# Patient Record
Sex: MALE | Race: BLACK OR AFRICAN AMERICAN | NOT HISPANIC OR LATINO | Employment: FULL TIME | URBAN - METROPOLITAN AREA
[De-identification: names, ages, dates, MRNs, and addresses within clinical notes are randomized per-mention and may not be internally consistent; named-entity substitution may affect disease eponyms.]

---

## 2020-10-22 ENCOUNTER — HOSPITAL ENCOUNTER (EMERGENCY)
Facility: OTHER | Age: 35
Discharge: HOME | End: 2020-10-22
Attending: EMERGENCY MEDICINE
Payer: MEDICAID

## 2020-10-22 VITALS
BODY MASS INDEX: 28.35 KG/M2 | HEART RATE: 90 BPM | TEMPERATURE: 99 F | OXYGEN SATURATION: 96 % | DIASTOLIC BLOOD PRESSURE: 72 MMHG | SYSTOLIC BLOOD PRESSURE: 134 MMHG | WEIGHT: 245 LBS | RESPIRATION RATE: 18 BRPM | HEIGHT: 78 IN

## 2020-10-22 DIAGNOSIS — Z76.0 ENCOUNTER FOR MEDICATION REFILL: ICD-10-CM

## 2020-10-22 DIAGNOSIS — S62.337A CLOSED DISPLACED FRACTURE OF NECK OF FIFTH METACARPAL BONE OF LEFT HAND, INITIAL ENCOUNTER: Primary | ICD-10-CM

## 2020-10-22 PROCEDURE — 29125 APPL SHORT ARM SPLINT STATIC: CPT | Mod: LT

## 2020-10-22 PROCEDURE — 99284 EMERGENCY DEPT VISIT MOD MDM: CPT | Mod: 25

## 2020-10-22 PROCEDURE — 25000003 PHARM REV CODE 250: Performed by: PHYSICIAN ASSISTANT

## 2020-10-22 RX ORDER — OXYCODONE AND ACETAMINOPHEN 5; 325 MG/1; MG/1
1 TABLET ORAL EVERY 6 HOURS PRN
Qty: 8 TABLET | Refills: 0 | OUTPATIENT
Start: 2020-10-22 | End: 2020-10-27

## 2020-10-22 RX ORDER — AMLODIPINE BESYLATE 5 MG/1
5 TABLET ORAL DAILY
Qty: 30 TABLET | Refills: 0 | Status: SHIPPED | OUTPATIENT
Start: 2020-10-22 | End: 2020-11-21

## 2020-10-22 RX ORDER — HYDROCHLOROTHIAZIDE 25 MG/1
25 TABLET ORAL DAILY
Qty: 30 TABLET | Refills: 0 | Status: SHIPPED | OUTPATIENT
Start: 2020-10-22 | End: 2021-02-22 | Stop reason: SDUPTHER

## 2020-10-22 RX ORDER — IBUPROFEN 400 MG/1
800 TABLET ORAL
Status: DISCONTINUED | OUTPATIENT
Start: 2020-10-22 | End: 2020-10-22

## 2020-10-22 RX ORDER — OXYCODONE AND ACETAMINOPHEN 5; 325 MG/1; MG/1
2 TABLET ORAL
Status: COMPLETED | OUTPATIENT
Start: 2020-10-22 | End: 2020-10-22

## 2020-10-22 RX ORDER — OMEPRAZOLE 20 MG/1
20 CAPSULE, DELAYED RELEASE ORAL DAILY
Qty: 30 CAPSULE | Refills: 0 | Status: SHIPPED | OUTPATIENT
Start: 2020-10-22 | End: 2021-06-08 | Stop reason: SDUPTHER

## 2020-10-22 RX ADMIN — OXYCODONE HYDROCHLORIDE AND ACETAMINOPHEN 2 TABLET: 5; 325 TABLET ORAL at 08:10

## 2020-10-22 NOTE — FIRST PROVIDER EVALUATION
Emergency Department TeleTriage Encounter Note      CHIEF COMPLAINT    Chief Complaint   Patient presents with    Hand Pain     pt hit  wall with left hand now with swelling and pain on little finger side.        VITAL SIGNS   Initial Vitals [10/22/20 1849]   BP Pulse Resp Temp SpO2   134/72 90 18 98.5 °F (36.9 °C) 96 %      MAP       --            ALLERGIES    Review of patient's allergies indicates:  No Known Allergies    PROVIDER TRIAGE NOTE  HPI: Mansoor Cain, a 34 y.o. male presents to the ED for evaluation of left hand pain after hitting a wall today.  Patient is right-hand dominant.  Denies any previous history of fracture surgeries to site.  No treatments tried.        ORDERS  Labs Reviewed - No data to display    ED Orders (720h ago, onward)    Start Ordered     Status Ordering Provider    10/22/20 1900 10/22/20 1857  ibuprofen tablet 800 mg  ED 1 Time      Ordered CHARLOTTE MORRIS    10/22/20 1858 10/22/20 1857  Apply ice pack to affected area  Once      Ordered CHARLOTTE MORRIS    10/22/20 1857 10/22/20 1857  X-Ray Hand 3 view Left  1 time imaging      Ordered CHARLOTTE MORRIS            Virtual Visit Note: The provider triage portion of this emergency department evaluation and documentation was performed via [employer], a HIPAA-compliant telemedicine application, in concert with a tele-presenter in the room. A face to face patient evaluation with one of my colleagues will occur once the patient is placed in an emergency department room.      DISCLAIMER: This note was prepared with [employer] voice recognition transcription software. Garbled syntax, mangled pronouns, and other bizarre constructions may be attributed to that software system.

## 2020-10-22 NOTE — Clinical Note
"Mansoor Whitten (Curtis)son was seen and treated in our emergency department on 10/22/2020.  He may return to work on 10/24/2020.       If you have any questions or concerns, please don't hesitate to call.       RN    "

## 2020-10-22 NOTE — Clinical Note
"Mansoor Whitten (Curtis)son was seen and treated in our emergency department on 10/22/2020.  He may return to work on 10/27/2020.       If you have any questions or concerns, please don't hesitate to call.       RN    "

## 2020-10-23 NOTE — DISCHARGE INSTRUCTIONS
You can take 600 mg of Motrin every 6-8 hours as needed for pain.  Use Percocet for breakthrough pain.

## 2020-10-23 NOTE — ED TRIAGE NOTES
Pt presents to ER via POV with c/o LEFT hand pain after punching a door yesterday. Pt reports the swelling and pain worsened today. Obvious swelling/deformity. Denies numbness/tingling to distal fingers but does report decreased sensation to medial palm.  Has not taken anything to alleviate pain.

## 2020-10-23 NOTE — ED PROVIDER NOTES
Encounter Date: 10/22/2020       History     Chief Complaint   Patient presents with    Hand Pain     pt hit  wall with left hand now with swelling and pain on little finger side.      Patient is a 34-year-old male with hypertension and GERD who presents to the emergency department with   Hand pain.  Patient states he punched a wall with his left hand yesterday.  He states he developed significant swelling and pain today.  He denies numbness.  He is right-hand dominant.   Patient is also requesting refill of his hypertension and GERD medication, he has ran out less than 1 week ago.    The history is provided by the patient.     Review of patient's allergies indicates:  No Known Allergies  Past Medical History:   Diagnosis Date    GERD (gastroesophageal reflux disease)     Hypertension      Past Surgical History:   Procedure Laterality Date    HERNIA REPAIR       No family history on file.  Social History     Tobacco Use    Smoking status: Current Every Day Smoker   Substance Use Topics    Alcohol use: Yes    Drug use: Not on file     Review of Systems   Constitutional: Negative for chills and fever.   Musculoskeletal: Positive for arthralgias and joint swelling.   Skin: Negative for color change and wound.   Allergic/Immunologic: Negative for immunocompromised state.   Neurological: Negative for weakness and numbness.       Physical Exam     Initial Vitals [10/22/20 1849]   BP Pulse Resp Temp SpO2   134/72 90 18 98.5 °F (36.9 °C) 96 %      MAP       --         Physical Exam    Constitutional: Vital signs are normal. He is cooperative. No distress.   HENT:   Head: Normocephalic and atraumatic.   Eyes: Conjunctivae and EOM are normal.   Neck: Normal range of motion. Neck supple.   Cardiovascular:   Pulses:       Radial pulses are 2+ on the right side and 2+ on the left side.   Pulmonary/Chest: No respiratory distress.   Musculoskeletal:      Comments: Significant edema to the volar, medial aspect of the left  hand with associated bony tenderness to the 5th metacarpal bone.  Normal range of motion of fingers.   Neurological: He is alert and oriented to person, place, and time. GCS eye subscore is 4. GCS verbal subscore is 5. GCS motor subscore is 6.   No signs of median, ulnar, radial nerve damage to the left hand.   Skin: Skin is warm and dry. No rash noted.         ED Course   Orthopedic Injury    Date/Time: 10/23/2020 10:10 AM  Performed by: Sudeep Chan PA-C  Authorized by: Norris Valenzuela MD     Location procedure was performed:  [facility], [place], operated by [facility] EMERGENCY DEPARTMENT  Injury:     Injury location:  Hand    Location details:  Left hand    Injury type:  Fracture    Fracture type: fifth metacarpal        Pre-procedure assessment:     Neurovascular status: Neurovascularly intact      Distal perfusion: normal      Neurological function: normal      Range of motion: normal        Selections made in this section will also lock the Injury type section above.:     Manipulation performed?: No      Immobilization:  Splint    Splint type:  Ulnar gutter    Supplies used:  Ortho-Glass    Complications: No    Post-procedure assessment:     Neurovascular status: Neurovascularly intact      Distal perfusion: normal      Neurological function: normal      Range of motion: splinted      Patient tolerance:  Patient tolerated the procedure well with no immediate complications      Labs Reviewed - No data to display       Imaging Results          X-Ray Hand 3 view Left (Final result)  Result time 10/22/20 19:40:42    Final result by Omid Medrano MD (10/22/20 19:40:42)                 Impression:      Acute 5th metacarpal fracture (boxer's fracture).      Electronically signed by: Omid Medrano MD  Date:    10/22/2020  Time:    19:40             Narrative:    EXAMINATION:  XR HAND COMPLETE 3 VIEW LEFT    CLINICAL HISTORY:  left hand pain;.    TECHNIQUE:  PA, lateral, and oblique views of the left hand were  performed.    COMPARISON:  None    FINDINGS:  Acute displaced fracture is seen involving the distal aspect of the 5th metacarpal head neck region.  There is palmar angulation of the distal fracture component.  No additional acute displaced fractures or dislocation seen.                                 Medical Decision Making:   Initial Assessment:   Urgent evaluation of a 34 y.o. male  presenting to the emergency department complaining of  left hand pain and swelling after punching a wall. Patient is afebrile, nontoxic appearing and hemodynamically stable.  - left hand is distally neurovascular intact.  No signs of tendon or nerve injury.  -  X-ray obtained from tele triage reveals an acute 5th metacarpal fracture with palmar angulation of the distal component.  ED Management:  -  Patient was given ice, analgesics.  Hand was splinted in ulnar gutter.  Patient is given information to follow-up with orthopedics.    He had no other complaints today and was stable at discharge.                             Clinical Impression:       ICD-10-CM ICD-9-CM   1. Closed displaced fracture of neck of fifth metacarpal bone of left hand, initial encounter  S62.337A 815.04   2. Encounter for medication refill  Z76.0 V68.1                          ED Disposition Condition    Discharge Stable        ED Prescriptions     Medication Sig Dispense Start Date End Date Auth. Provider    amLODIPine (NORVASC) 5 MG tablet Take 1 tablet (5 mg total) by mouth once daily. 30 tablet 10/22/2020 11/21/2020 Sudeep Chan PA-C    hydroCHLOROthiazide (HYDRODIURIL) 25 MG tablet Take 1 tablet (25 mg total) by mouth once daily. 30 tablet 10/22/2020  Sudeep Chan PA-C    omeprazole (PRILOSEC) 20 MG capsule Take 1 capsule (20 mg total) by mouth once daily. 30 capsule 10/22/2020  Sudeep Chan PA-C    oxyCODONE-acetaminophen (PERCOCET) 5-325 mg per tablet Take 1 tablet by mouth every 6 (six) hours as needed for Pain. 8 tablet 10/22/2020  Sudeep  ANAIS Chan PA-C        Follow-up Information     Follow up With Specialties Details Why Contact Info    [facility]- Dr. Guzman ([facility]) Every Other Wednesday  Schedule an appointment as soon as possible for a visit   [phone]    [facility]  Schedule an appointment as soon as possible for a visit   [phone]                                       Sudeep Chan PA-C  10/23/20 1013

## 2020-10-27 ENCOUNTER — HOSPITAL ENCOUNTER (EMERGENCY)
Facility: OTHER | Age: 35
Discharge: HOME | End: 2020-10-27
Attending: EMERGENCY MEDICINE
Payer: MEDICAID

## 2020-10-27 VITALS
BODY MASS INDEX: 28.35 KG/M2 | HEIGHT: 78 IN | DIASTOLIC BLOOD PRESSURE: 72 MMHG | SYSTOLIC BLOOD PRESSURE: 140 MMHG | WEIGHT: 245 LBS | HEART RATE: 91 BPM | RESPIRATION RATE: 18 BRPM | OXYGEN SATURATION: 96 % | TEMPERATURE: 98 F

## 2020-10-27 DIAGNOSIS — S62.337A CLOSED DISPLACED FRACTURE OF NECK OF FIFTH METACARPAL BONE OF LEFT HAND, INITIAL ENCOUNTER: Primary | ICD-10-CM

## 2020-10-27 PROCEDURE — 99284 EMERGENCY DEPT VISIT MOD MDM: CPT

## 2020-10-27 RX ORDER — IBUPROFEN 800 MG/1
800 TABLET ORAL EVERY 6 HOURS PRN
Qty: 30 TABLET | Refills: 0 | Status: SHIPPED | OUTPATIENT
Start: 2020-10-27

## 2020-10-27 RX ORDER — OXYCODONE AND ACETAMINOPHEN 5; 325 MG/1; MG/1
1 TABLET ORAL EVERY 4 HOURS PRN
Qty: 10 TABLET | Refills: 0 | Status: SHIPPED | OUTPATIENT
Start: 2020-10-27 | End: 2021-02-22 | Stop reason: CLARIF

## 2020-10-27 NOTE — ED PROVIDER NOTES
"Encounter Date: 10/27/2020    SCRIBE #1 NOTE: I, Joy Levine, am scribing for, and in the presence of, Dr. Thrasher.       History     Chief Complaint   Patient presents with    Medication Refill     +Seen and evaluated on 10/22 for metacarpal fracture, reporting continued pain to hand, requesting refill of medication.      Time seen by provider: 3:12 PM    This is a 34 y.o. male with hx of HTN who presents with complaint of left hand pain. Patient was seen here five days ago after he punched a wall one day prior and had X-Ray that showed left fifth metacarpal fracture. His hand was placed in a cast and he does not have a sling. He reports slamming his left hand in a car door a couple hours ago. The door closed on the area protected by the cast, though he has had increased pain since then. He is not concerned for new or worsening of current fracture and does not want an X-Ray. He requests refill of pain medication. He denies numbness or weakness of exposed fingers. He is right hand dominant.    The history is provided by the patient and medical records.     Review of patient's allergies indicates:   Allergen Reactions    Hydrocodone Swelling    Acetaminophen Other (See Comments)     "it gives me heartburn"     Past Medical History:   Diagnosis Date    GERD (gastroesophageal reflux disease)     Hypertension      Past Surgical History:   Procedure Laterality Date    HERNIA REPAIR       No family history on file.  Social History     Tobacco Use    Smoking status: Current Every Day Smoker   Substance Use Topics    Alcohol use: Yes    Drug use: Not on file     Review of Systems   Constitutional: Negative for fever.   HENT: Negative for sore throat.    Respiratory: Negative for shortness of breath.    Cardiovascular: Negative for chest pain.   Gastrointestinal: Negative for nausea.   Genitourinary: Negative for dysuria.   Musculoskeletal:        Positive for hand pain.   Skin: Negative for wound.   Neurological: " Negative for weakness and numbness.   Hematological: Does not bruise/bleed easily.       Physical Exam     Initial Vitals [10/27/20 1438]   BP Pulse Resp Temp SpO2   (!) 140/72 91 18 98.2 °F (36.8 °C) 96 %      MAP       --         Physical Exam    Nursing note and vitals reviewed.  Constitutional: He appears well-developed and well-nourished. He is not diaphoretic. No distress.   Right hand dominant.   HENT:   Head: Normocephalic and atraumatic.   Eyes: EOM are normal.   Neck: Normal range of motion. Neck supple.   Pulmonary/Chest: No respiratory distress.   Musculoskeletal:      Comments: LUE: Left hand in ulnar gutter, well fitting. No distal edema. Normal ROM at exposed fingers and elbow.   Neurological: He is alert and oriented to person, place, and time. No sensory deficit.   Skin: Skin is warm and dry. Capillary refill takes less than 2 seconds.   Psychiatric: He has a normal mood and affect. His behavior is normal.         ED Course   Procedures  Labs Reviewed - No data to display            Medical Decision Making:   History:   Old Medical Records: I decided to obtain old medical records.            Scribe Attestation:   Scribe #1: I performed the above scribed service and the documentation accurately describes the services I performed. I attest to the accuracy of the note.    Attending Attestation:           Physician Attestation for Scribe:  Physician Attestation Statement for Scribe #1: I, Dr. Thrasher, reviewed documentation, as scribed by Joy Levine in my presence, and it is both accurate and complete.                    Patient presents due to pain in the left hand.  Fractured approximately 5 days ago when punching a wall with his non dominant hand.  Seen here several days ago a splint was placed.  He states it was hit inside a closing car door on the splint earlier today, causing increasing pain .  On exam the cast is intact, he does not have significant edema and has good distal strength and  sensation, capillary refill.  He does not describe the impact as significant enough that I think it would translate to any damage within the splint material.  He states he has run out of the pain medication prescribed.  He also has not been elevating or using a sling he will be provided with 1, I discussed rice.  Will start anti-inflammatory in addition to a refill of the narcotic pain medication.  Stressed the need to follow-up with orthopedics       Clinical Impression:       ICD-10-CM ICD-9-CM   1. Closed displaced fracture of neck of fifth metacarpal bone of left hand, initial encounter  S62.337A 815.04                          ED Disposition Condition    Discharge Stable        ED Prescriptions     Medication Sig Dispense Start Date End Date Auth. Provider    ibuprofen (ADVIL,MOTRIN) 800 MG tablet Take 1 tablet (800 mg total) by mouth every 6 (six) hours as needed for Pain. 30 tablet 10/27/2020  Lan Thrasher II, MD    oxyCODONE-acetaminophen (PERCOCET) 5-325 mg per tablet Take 1 tablet by mouth every 4 (four) hours as needed for Pain. 10 tablet 10/27/2020  Lan Thrasher II, MD        Follow-up Information     Follow up With Specialties Details Why Contact [facility] Surgical Oncology, Orthopedic Surgery, Genetics, Physical Medicine and Rehabilitation, Occupational Therapy, Radiology Call in 1 day Schedule an appointment with [facility]- Dr. Guzman ([facility]) Every Other Wednesday [address] [facility] [address]  [phone]                                         Lan Thrasher II, MD  10/27/20 1715       Lan Thrasher II, MD  10/27/20 1715       Lan Thrasher II, MD  11/06/20 0273

## 2020-11-19 ENCOUNTER — HOSPITAL ENCOUNTER (EMERGENCY)
Facility: HOSPITAL | Age: 35
Discharge: HOME | End: 2020-11-19
Attending: EMERGENCY MEDICINE
Payer: MEDICAID

## 2020-11-19 VITALS
BODY MASS INDEX: 28.35 KG/M2 | RESPIRATION RATE: 18 BRPM | DIASTOLIC BLOOD PRESSURE: 89 MMHG | SYSTOLIC BLOOD PRESSURE: 138 MMHG | HEIGHT: 78 IN | WEIGHT: 245 LBS | TEMPERATURE: 98 F | HEART RATE: 88 BPM | OXYGEN SATURATION: 100 %

## 2020-11-19 DIAGNOSIS — Z47.89 CAST DISCOMFORT: Primary | ICD-10-CM

## 2020-11-19 DIAGNOSIS — S62.317D CLOSED DISPLACED FRACTURE OF BASE OF FIFTH METACARPAL BONE OF LEFT HAND WITH ROUTINE HEALING, SUBSEQUENT ENCOUNTER: ICD-10-CM

## 2020-11-19 PROCEDURE — 29125 APPL SHORT ARM SPLINT STATIC: CPT | Mod: LT

## 2020-11-19 PROCEDURE — 99284 PR EMERGENCY DEPT VISIT,LEVEL IV: ICD-10-PCS | Mod: 25,,, | Performed by: EMERGENCY MEDICINE

## 2020-11-19 PROCEDURE — 99284 EMERGENCY DEPT VISIT MOD MDM: CPT | Mod: 25,,, | Performed by: EMERGENCY MEDICINE

## 2020-11-19 PROCEDURE — 25000003 PHARM REV CODE 250: Performed by: EMERGENCY MEDICINE

## 2020-11-19 PROCEDURE — 29125 PR APPLY FOREARM SPLINT,STATIC: ICD-10-PCS | Mod: LT,,, | Performed by: EMERGENCY MEDICINE

## 2020-11-19 PROCEDURE — 29125 APPL SHORT ARM SPLINT STATIC: CPT | Mod: LT,,, | Performed by: EMERGENCY MEDICINE

## 2020-11-19 PROCEDURE — 99283 EMERGENCY DEPT VISIT LOW MDM: CPT

## 2020-11-19 RX ORDER — IBUPROFEN 600 MG/1
600 TABLET ORAL
Status: COMPLETED | OUTPATIENT
Start: 2020-11-19 | End: 2020-11-19

## 2020-11-19 RX ADMIN — IBUPROFEN 600 MG: 600 TABLET, FILM COATED ORAL at 05:11

## 2020-11-19 NOTE — ED NOTES
Patient identifiers verified and correct for Mr Cain  C/C: Cast splint removed per patient SEE NN  APPEARANCE: awake and alert in NAD.  SKIN: warm, dry and intact. No breakdown or bruising.  MUSCULOSKELETAL: Patient moving all extremities spontaneously, no obvious swelling or deformities noted. Ambulates independently.  RESPIRATORY: Denies shortness of breath.Respirations unlabored.   CARDIAC: Denies CP, 2+ distal pulses; no peripheral edema  ABDOMEN: S/ND/NT, Denies nausea  : voids spontaneously, denies difficulty  Neurologic: AAO x 4; follows commands equal strength in all extremities; denies numbness/tingling. Denies dizziness Denies weakness

## 2020-11-19 NOTE — ED PROVIDER NOTES
"Encounter Date: 11/19/2020    SCRIBE #1 NOTE: I, Porfirio Doan, am scribing for, and in the presence of,  Dane Peres MD. I have scribed the entire note.       History     Chief Complaint   Patient presents with    Cast Repair     placed on oct 21, splint got wet,      The patient is a 35 y.o. male with PMHx of HTN and GERD who presents to the ED with a request for a splint repair after his splint got wet earlier today. The splint was initially placed on Oct 21. The patient presents with pain in the dorsal, medial aspect of his left hand. He is unable to clench his hand into a fist due to the pain. The patient has an appointment with orthopedics at [facility] next week for the cast. The patient has not taken anything for pain PTA, and is requesting pain medications in the ED. His allergies include hydrocodone and acetaminophen.      The history is provided by the patient and medical records.     Review of patient's allergies indicates:   Allergen Reactions    Hydrocodone Swelling    Acetaminophen Other (See Comments)     "it gives me heartburn"     Past Medical History:   Diagnosis Date    GERD (gastroesophageal reflux disease)     Hypertension      Past Surgical History:   Procedure Laterality Date    HERNIA REPAIR       History reviewed. No pertinent family history.  Social History     Tobacco Use    Smoking status: Current Every Day Smoker     Packs/day: 0.50     Types: Cigarettes    Smokeless tobacco: Never Used   Substance Use Topics    Alcohol use: Not Currently    Drug use: Never     Review of Systems  General: No fever.  No chills.  Eyes: No visual changes.  Head: No headache.    Integument: No rashes or lesions.  Chest: No shortness of breath.  Cardiovascular: No chest pain.  Abdomen: No abdominal pain.  No nausea or vomiting.  Urinary: No abnormal urination.  Neurologic: No focal weakness.  No numbness.  Hematologic: No easy bruising.  Musculoskeletal: +Right hand pain (see " HPI)  Endocrine: No excessive thirst or urination.    Physical Exam     Initial Vitals [11/19/20 1700]   BP Pulse Resp Temp SpO2   138/89 88 18 98.1 °F (36.7 °C) 100 %      MAP       --         Physical Exam  Appearance: No acute distress.  Skin: No rashes seen.  Good turgor.  No abrasions.  No ecchymoses.  Eyes: No conjunctival injection. EOMI, PERRL.  ENT: Oropharynx clear.    Chest:  No increased work of breathing, bilateral chest rise.  Cardiovascular: Regular rate and rhythm.  Normal equal bilateral radial pulses.  Abdomen: Soft.  Not distended.  Nontender.  No guarding.  No rebound. No Masses  Musculoskeletal: Good range of motion all joints.  No deformities.  Neck supple, full range of motion, no obvious deformity. Normal PSMs. Tenderness over the fifth MCP. No gross deformity in the RUE. Full ROM of right wrist, PIP, and DIP.  Neurologic: Moves all extremities.  Normal sensation.  No facial droop.  Normal speech.    Mental Status:  Alert and oriented x 3.  Appropriate, conversant.      ED Course   Splint Application    Date/Time: 11/19/2020 5:59 PM  Performed by: Dane Peres MD  Authorized by: Dane Peres MD   Consent Done: Yes  Consent: Verbal consent obtained.  Consent given by: patient  Location details: left hand  Splint type: ulnar gutter  Supplies used: Ortho-Glass  Post-procedure: The splinted body part was neurovascularly unchanged following the procedure.  Patient tolerance: Patient tolerated the procedure well with no immediate complications        Labs Reviewed - No data to display       Imaging Results    None          Medical Decision Making:   History:   Old Medical Records: I decided to obtain old medical records.  Old Records Summarized: other records.      patient here for reapplication his ulnar gutter splint after getting it wet earlier today.  No signs of new injury.  Neurovascularly intact.  Cast was applied as above.  Do not suspect new fracture, infection, nerve or vessel  injury.  Stable for discharge. Will followup with ortho.         Scribe Attestation:   Scribe #1: I performed the above scribed service and the documentation accurately describes the services I performed. I attest to the accuracy of the note.                      Clinical Impression:         Disposition:   Disposition: Discharged  Condition: Stable                          Dane Peres MD  11/19/20 2121

## 2020-11-19 NOTE — ED TRIAGE NOTES
Patient states his cast to left hand got wet so he pulled it off this morning, arrives with Ace wrap in place. Splint originally placed 10/21

## 2020-11-24 ENCOUNTER — TELEPHONE (OUTPATIENT)
Dept: ORTHOPEDICS | Facility: CLINIC | Age: 35
End: 2020-11-24

## 2020-11-27 ENCOUNTER — TELEPHONE (OUTPATIENT)
Dept: SURGERY | Facility: CLINIC | Age: 35
End: 2020-11-27

## 2020-11-30 ENCOUNTER — TELEPHONE (OUTPATIENT)
Dept: DIABETES | Facility: CLINIC | Age: 35
End: 2020-11-30

## 2020-11-30 DIAGNOSIS — T14.8XXA FRACTURE: Primary | ICD-10-CM

## 2020-11-30 NOTE — TELEPHONE ENCOUNTER
called and would like to get patient in sooner has a fracture. Message was previously left on voicemail. Patient needs an ASAP appt

## 2020-11-30 NOTE — TELEPHONE ENCOUNTER
----- Message from Jodie Barnard sent at 11/30/2020  4:25 PM CST -----  Chelita with #[phone]   She's calling for a sooner appt

## 2020-11-30 NOTE — TELEPHONE ENCOUNTER
"Returned call to "Beverly." Beverly stated she is a  with the "[employer]." Beverly placed Mansoor, the patient, on speaker phone so that I may speak with him. Mansoor has been made aware of his appointment for this upcoming Wednesday at 10 AM, and that updated imaging will be needed prior to his appointment time. I encouraged going down to registration at 9 am to have imaging of his hand completed the morning of his appointment. Beverly and Mansoor both voiced understanding and neither had questions. No further issues discussed. X-ray order placed.  "

## 2020-11-30 NOTE — TELEPHONE ENCOUNTER
----- Message from Sirena Guevara sent at 11/30/2020  4:04 PM CST -----  Contact: Elvia Villegas has a referral in the system needing an appt for his hand fracture.       Contact Info

## 2020-12-01 ENCOUNTER — TELEPHONE (OUTPATIENT)
Dept: ORTHOPEDICS | Facility: CLINIC | Age: 35
End: 2020-12-01

## 2020-12-01 NOTE — TELEPHONE ENCOUNTER
Left voicemail for patient to call our office, need to remind patient to go for xray prior to appointment on 12/2/2020.

## 2020-12-02 NOTE — TELEPHONE ENCOUNTER
----- Message from Delia Tidwell sent at 12/2/2020  9:10 AM CST -----  Regarding: appt  Contact: [place] [address]  Pt having transportation issue  Unable to accommodate him.   Wrong address on medicaid file   Please call pt to reschedule  appt was today  12/2    Beverly from [place] [facility]   called with information    [phone]  If you need to speak to her

## 2020-12-23 ENCOUNTER — OFFICE VISIT (OUTPATIENT)
Dept: ORTHOPEDICS | Facility: CLINIC | Age: 35
End: 2020-12-23
Payer: MEDICAID

## 2020-12-23 VITALS
HEART RATE: 79 BPM | SYSTOLIC BLOOD PRESSURE: 125 MMHG | WEIGHT: 242.94 LBS | BODY MASS INDEX: 28.11 KG/M2 | HEIGHT: 78 IN | DIASTOLIC BLOOD PRESSURE: 83 MMHG

## 2020-12-23 DIAGNOSIS — S62.317D CLOSED DISPLACED FRACTURE OF BASE OF FIFTH METACARPAL BONE OF LEFT HAND WITH ROUTINE HEALING, SUBSEQUENT ENCOUNTER: Primary | ICD-10-CM

## 2020-12-23 PROCEDURE — 99999 PR PBB SHADOW E&M-EST. PATIENT-LVL III: ICD-10-PCS | Mod: PBBFAC,,, | Performed by: ORTHOPAEDIC SURGERY

## 2020-12-23 PROCEDURE — 99203 PR OFFICE/OUTPT VISIT, NEW, LEVL III, 30-44 MIN: ICD-10-PCS | Mod: S$PBB,,, | Performed by: ORTHOPAEDIC SURGERY

## 2020-12-23 PROCEDURE — 99999 PR PBB SHADOW E&M-EST. PATIENT-LVL III: CPT | Mod: PBBFAC,,, | Performed by: ORTHOPAEDIC SURGERY

## 2020-12-23 PROCEDURE — 99213 OFFICE O/P EST LOW 20 MIN: CPT | Mod: PBBFAC,PN | Performed by: ORTHOPAEDIC SURGERY

## 2020-12-23 PROCEDURE — 99203 OFFICE O/P NEW LOW 30 MIN: CPT | Mod: S$PBB,,, | Performed by: ORTHOPAEDIC SURGERY

## 2020-12-23 RX ORDER — CELECOXIB 200 MG/1
CAPSULE ORAL
COMMUNITY
Start: 2020-11-06

## 2021-01-19 ENCOUNTER — TELEPHONE (OUTPATIENT)
Dept: ORTHOPEDICS | Facility: CLINIC | Age: 36
End: 2021-01-19

## 2021-02-22 ENCOUNTER — HOSPITAL ENCOUNTER (EMERGENCY)
Facility: HOSPITAL | Age: 36
Discharge: HOME | End: 2021-02-22
Attending: EMERGENCY MEDICINE
Payer: MEDICAID

## 2021-02-22 VITALS
WEIGHT: 245 LBS | BODY MASS INDEX: 28.35 KG/M2 | HEART RATE: 88 BPM | DIASTOLIC BLOOD PRESSURE: 90 MMHG | OXYGEN SATURATION: 98 % | TEMPERATURE: 98 F | HEIGHT: 78 IN | SYSTOLIC BLOOD PRESSURE: 158 MMHG | RESPIRATION RATE: 18 BRPM

## 2021-02-22 DIAGNOSIS — R51.9 ACUTE NONINTRACTABLE HEADACHE, UNSPECIFIED HEADACHE TYPE: Primary | ICD-10-CM

## 2021-02-22 PROBLEM — S02.30XA ORBITAL FLOOR (BLOW-OUT) CLOSED FRACTURE: Status: ACTIVE | Noted: 2019-01-07

## 2021-02-22 LAB
CTP QC/QA: YES
SARS-COV-2 RDRP RESP QL NAA+PROBE: NEGATIVE

## 2021-02-22 PROCEDURE — 99284 EMERGENCY DEPT VISIT MOD MDM: CPT | Mod: ,,, | Performed by: EMERGENCY MEDICINE

## 2021-02-22 PROCEDURE — 99284 PR EMERGENCY DEPT VISIT,LEVEL IV: ICD-10-PCS | Mod: ,,, | Performed by: EMERGENCY MEDICINE

## 2021-02-22 PROCEDURE — 99284 EMERGENCY DEPT VISIT MOD MDM: CPT

## 2021-02-22 PROCEDURE — 25000003 PHARM REV CODE 250: Performed by: PHYSICIAN ASSISTANT

## 2021-02-22 PROCEDURE — U0002 COVID-19 LAB TEST NON-CDC: HCPCS | Performed by: EMERGENCY MEDICINE

## 2021-02-22 RX ORDER — NAPROXEN 500 MG/1
500 TABLET ORAL
Status: COMPLETED | OUTPATIENT
Start: 2021-02-22 | End: 2021-02-22

## 2021-02-22 RX ORDER — ONDANSETRON 8 MG/1
8 TABLET, ORALLY DISINTEGRATING ORAL
Status: COMPLETED | OUTPATIENT
Start: 2021-02-22 | End: 2021-02-22

## 2021-02-22 RX ORDER — AMLODIPINE BESYLATE 10 MG/1
5 TABLET ORAL DAILY
Qty: 30 TABLET | Refills: 0 | Status: SHIPPED | OUTPATIENT
Start: 2021-02-22 | End: 2021-06-08 | Stop reason: SDUPTHER

## 2021-02-22 RX ORDER — HYDROCHLOROTHIAZIDE 25 MG/1
25 TABLET ORAL DAILY
Qty: 30 TABLET | Refills: 0 | Status: SHIPPED | OUTPATIENT
Start: 2021-02-22 | End: 2021-03-24

## 2021-02-22 RX ORDER — ONDANSETRON 4 MG/1
4 TABLET, ORALLY DISINTEGRATING ORAL EVERY 6 HOURS PRN
Qty: 15 TABLET | Refills: 0 | Status: SHIPPED | OUTPATIENT
Start: 2021-02-22

## 2021-02-22 RX ORDER — NAPROXEN 500 MG/1
500 TABLET ORAL 2 TIMES DAILY WITH MEALS
Qty: 20 TABLET | Refills: 0 | Status: SHIPPED | OUTPATIENT
Start: 2021-02-22

## 2021-02-22 RX ADMIN — NAPROXEN 500 MG: 500 TABLET ORAL at 08:02

## 2021-02-22 RX ADMIN — ONDANSETRON 8 MG: 8 TABLET, ORALLY DISINTEGRATING ORAL at 08:02

## 2021-02-24 ENCOUNTER — PATIENT OUTREACH (OUTPATIENT)
Dept: EMERGENCY MEDICINE | Facility: HOSPITAL | Age: 36
End: 2021-02-24

## 2021-04-16 ENCOUNTER — PATIENT MESSAGE (OUTPATIENT)
Dept: RESEARCH | Facility: HOSPITAL | Age: 36
End: 2021-04-16

## 2021-06-08 ENCOUNTER — HOSPITAL ENCOUNTER (EMERGENCY)
Facility: HOSPITAL | Age: 36
Discharge: HOME | End: 2021-06-08
Attending: EMERGENCY MEDICINE
Payer: MEDICAID

## 2021-06-08 VITALS
OXYGEN SATURATION: 100 % | HEIGHT: 78 IN | BODY MASS INDEX: 28.35 KG/M2 | WEIGHT: 245 LBS | TEMPERATURE: 98 F | SYSTOLIC BLOOD PRESSURE: 129 MMHG | HEART RATE: 57 BPM | DIASTOLIC BLOOD PRESSURE: 71 MMHG | RESPIRATION RATE: 16 BRPM

## 2021-06-08 DIAGNOSIS — M25.522 LEFT ELBOW PAIN: ICD-10-CM

## 2021-06-08 DIAGNOSIS — M70.22 OLECRANON BURSITIS OF LEFT ELBOW: Primary | ICD-10-CM

## 2021-06-08 DIAGNOSIS — M79.602 LEFT ARM PAIN: ICD-10-CM

## 2021-06-08 LAB
HCV AB SERPL QL IA: NEGATIVE
HIV 1+2 AB+HIV1 P24 AG SERPL QL IA: NEGATIVE

## 2021-06-08 PROCEDURE — 99285 PR EMERGENCY DEPT VISIT,LEVEL V: ICD-10-PCS | Mod: ,,, | Performed by: PHYSICIAN ASSISTANT

## 2021-06-08 PROCEDURE — 99284 EMERGENCY DEPT VISIT MOD MDM: CPT | Mod: 25

## 2021-06-08 PROCEDURE — 99285 EMERGENCY DEPT VISIT HI MDM: CPT | Mod: ,,, | Performed by: PHYSICIAN ASSISTANT

## 2021-06-08 PROCEDURE — 25000003 PHARM REV CODE 250: Performed by: PHYSICIAN ASSISTANT

## 2021-06-08 PROCEDURE — 86703 HIV-1/HIV-2 1 RESULT ANTBDY: CPT | Performed by: EMERGENCY MEDICINE

## 2021-06-08 PROCEDURE — 86803 HEPATITIS C AB TEST: CPT | Performed by: EMERGENCY MEDICINE

## 2021-06-08 RX ORDER — AMLODIPINE BESYLATE 10 MG/1
5 TABLET ORAL DAILY
Qty: 15 TABLET | Refills: 0 | Status: SHIPPED | OUTPATIENT
Start: 2021-06-08 | End: 2021-07-08

## 2021-06-08 RX ORDER — OMEPRAZOLE 20 MG/1
20 CAPSULE, DELAYED RELEASE ORAL DAILY
Qty: 30 CAPSULE | Refills: 0 | Status: SHIPPED | OUTPATIENT
Start: 2021-06-08

## 2021-06-08 RX ORDER — IBUPROFEN 400 MG/1
800 TABLET ORAL
Status: COMPLETED | OUTPATIENT
Start: 2021-06-08 | End: 2021-06-08

## 2021-06-08 RX ADMIN — IBUPROFEN 800 MG: 400 TABLET, FILM COATED ORAL at 02:06

## 2021-09-17 ENCOUNTER — HOSPITAL ENCOUNTER (EMERGENCY)
Facility: HOSPITAL | Age: 36
Discharge: HOME | End: 2021-09-17
Attending: EMERGENCY MEDICINE
Payer: MEDICAID

## 2021-09-17 VITALS
HEART RATE: 73 BPM | BODY MASS INDEX: 28.35 KG/M2 | TEMPERATURE: 98 F | WEIGHT: 245 LBS | RESPIRATION RATE: 18 BRPM | DIASTOLIC BLOOD PRESSURE: 90 MMHG | HEIGHT: 78 IN | OXYGEN SATURATION: 99 % | SYSTOLIC BLOOD PRESSURE: 143 MMHG

## 2021-09-17 DIAGNOSIS — S62.114A CLOSED NONDISPLACED FRACTURE OF TRIQUETRUM OF RIGHT WRIST, INITIAL ENCOUNTER: ICD-10-CM

## 2021-09-17 DIAGNOSIS — S62.141A CLOSED DISPLACED FRACTURE OF HAMATE BONE OF RIGHT WRIST, UNSPECIFIED PORTION OF HAMATE, INITIAL ENCOUNTER: ICD-10-CM

## 2021-09-17 DIAGNOSIS — S62.349A CLOSED NONDISPLACED FRACTURE OF BASE OF METACARPAL BONE, UNSPECIFIED FRACTURE MORPHOLOGY, UNSPECIFIED METACARPAL, INITIAL ENCOUNTER: ICD-10-CM

## 2021-09-17 DIAGNOSIS — S62.91XA CLOSED FRACTURE OF RIGHT HAND, INITIAL ENCOUNTER: Primary | ICD-10-CM

## 2021-09-17 PROCEDURE — 99284 PR EMERGENCY DEPT VISIT,LEVEL IV: ICD-10-PCS | Mod: ,,, | Performed by: PHYSICIAN ASSISTANT

## 2021-09-17 PROCEDURE — 99284 EMERGENCY DEPT VISIT MOD MDM: CPT | Mod: 25

## 2021-09-17 PROCEDURE — 99284 EMERGENCY DEPT VISIT MOD MDM: CPT | Mod: ,,, | Performed by: PHYSICIAN ASSISTANT

## 2021-09-17 PROCEDURE — 29125 APPL SHORT ARM SPLINT STATIC: CPT | Mod: RT

## 2021-09-17 PROCEDURE — 25000003 PHARM REV CODE 250: Performed by: PHYSICIAN ASSISTANT

## 2021-09-17 RX ORDER — OMEPRAZOLE 20 MG/1
20 CAPSULE, DELAYED RELEASE ORAL DAILY
Qty: 90 CAPSULE | Refills: 0 | Status: SHIPPED | OUTPATIENT
Start: 2021-09-17

## 2021-09-17 RX ORDER — OXYCODONE AND ACETAMINOPHEN 5; 325 MG/1; MG/1
1 TABLET ORAL EVERY 6 HOURS PRN
Qty: 6 TABLET | Refills: 0 | Status: SHIPPED | OUTPATIENT
Start: 2021-09-17

## 2021-09-17 RX ORDER — AMLODIPINE BESYLATE 10 MG/1
10 TABLET ORAL DAILY
Qty: 30 TABLET | Refills: 0 | Status: SHIPPED | OUTPATIENT
Start: 2021-09-17

## 2021-09-17 RX ORDER — IBUPROFEN 600 MG/1
600 TABLET ORAL
Status: COMPLETED | OUTPATIENT
Start: 2021-09-17 | End: 2021-09-17

## 2021-09-17 RX ADMIN — IBUPROFEN 600 MG: 600 TABLET ORAL at 02:09
